# Patient Record
Sex: MALE | Race: WHITE | NOT HISPANIC OR LATINO | URBAN - METROPOLITAN AREA
[De-identification: names, ages, dates, MRNs, and addresses within clinical notes are randomized per-mention and may not be internally consistent; named-entity substitution may affect disease eponyms.]

---

## 2019-08-22 ENCOUNTER — APPOINTMENT (RX ONLY)
Dept: URBAN - METROPOLITAN AREA CLINIC 368 | Facility: CLINIC | Age: 29
Setting detail: DERMATOLOGY
End: 2019-08-22

## 2019-08-22 DIAGNOSIS — B08.1 MOLLUSCUM CONTAGIOSUM: ICD-10-CM

## 2019-08-22 PROCEDURE — ? DEFER

## 2019-08-22 PROCEDURE — ? TREATMENT REGIMEN

## 2019-08-22 PROCEDURE — 99202 OFFICE O/P NEW SF 15 MIN: CPT

## 2019-08-22 PROCEDURE — ? PRESCRIPTION

## 2019-08-22 PROCEDURE — ? COUNSELING

## 2019-08-22 RX ORDER — IMIQUIMOD 37.5 MG/G
CREAM TOPICAL
Qty: 1 | Refills: 2 | Status: ERX | COMMUNITY
Start: 2019-08-22

## 2019-08-22 RX ADMIN — IMIQUIMOD: 37.5 CREAM TOPICAL at 18:53

## 2019-08-22 ASSESSMENT — LOCATION DETAILED DESCRIPTION DERM
LOCATION DETAILED: LEFT LATERAL DISTAL DORSAL PENILE SHAFT
LOCATION DETAILED: DISTAL DORSAL PENILE SHAFT
LOCATION DETAILED: RIGHT LATERAL MID-DORSAL PENILE SHAFT

## 2019-08-22 ASSESSMENT — LOCATION SIMPLE DESCRIPTION DERM
LOCATION SIMPLE: DORSAL PENIS
LOCATION SIMPLE: LEFT PENIS

## 2019-08-22 ASSESSMENT — LOCATION ZONE DERM: LOCATION ZONE: PENIS

## 2019-08-22 NOTE — PROCEDURE: TREATMENT REGIMEN
Initiate Treatment: Apply imiquimod to the affected areas of the penis with a Q-tip 5 days out of the week. Apply Vaseline for any crusting or scabbing that may occur. [place]
Detail Level: Zone

## 2019-08-22 NOTE — PROCEDURE: DEFER
Introduction Text (Please End With A Colon): benign cosmetic destruction
Procedure To Be Performed At Next Visit: Liquid nitrogen
Detail Level: Simple